# Patient Record
(demographics unavailable — no encounter records)

---

## 2017-09-23 NOTE — UC
UC General HPI





- HPI Summary


HPI Summary: 





35 YO MALE RECENTLY MOVED HERE FROM [place]





[facility]





HE HAS A SCHIZO AFFECTIVE DISORDER





IT WILL BE 2 WEEKS BEFORE HE SEES AN MD





HE CURRENTLY HAS NO COMPLAINTS











- History of Current Complaint


Chief Complaint: UCMedRefill


Stated Complaint: MED REFILL


Time Seen by Provider: 09/23/17 09:06


Hx Obtained From: Patient


Onset/Duration: Other - na


Pain Intensity: 0


Associated Signs & Symptoms: Negative: Agitation, Abdominal Pain, 

Anticoagulation Therapy, Back Pain, Confusion, Cough, Chest Pain, Decreased 

Responsiveness, Dizziness, Diarrhea, Dysuria, Decreased Oral Intake, Diaphoresis

, Edema, Fever, Headache, Hematemesis, Hemoptysis, Immunocompromised, In-

Dwelling Medication Device, Melena, Nausea, Palpitations, Recent Medication 

Changes, Syncope, SOB, Trauma, Vomiting, Wheezing, Weakness





- Allergy/Home Medications


Allergies/Adverse Reactions: 


 Allergies











Allergy/AdvReac Type Severity Reaction Status Date / Time


 


No Known Allergies Allergy   Verified 09/23/17 08:58











Home Medications: 


 Home Medications





Divalproex DR TAB(*) [Depakote DR TAB(*)] 1 tab PO BID 09/23/17 [History 

Confirmed 09/23/17]


risperiDONE TAB* [Risperdal*] 1 tab PO BEDTIME 09/23/17 [History Confirmed 09/23 /17]


risperiDONE TAB* [Risperdal*] 1 tab PO DAILY 09/23/17 [History Confirmed 09/23/ 17]











PMH/Surg Hx/FS Hx/Imm Hx


Previously Healthy: Yes


Psychological History: Other - SCHIZOAFFECTIVE DISORDER


Other Psychological History: SCHIZOAFFECTIVE DISORDER





- Surgical History


Surgical History: None





- Family History


Known Family History: Positive: Hypertension


   Negative: Cardiac Disease, Diabetes





- Social History


Alcohol Use: Occasionally


Substance Use Type: None


Smoking Status (MU): Heavy Every Day Tobacco Smoker


Amount Used/How Often: 1/2 ppd





Review of Systems


Constitutional: Negative


Skin: Negative


Eyes: Negative


ENT: Negative


Respiratory: Negative


Cardiovascular: Negative


Gastrointestinal: Negative


Genitourinary: Negative


Motor: Negative


Neurovascular: Negative


Musculoskeletal: Negative


Neurological: Negative


Psychological: Negative


Is Patient Immunocompromised?: No


All Other Systems Reviewed And Are Negative: Yes





Physical Exam


Triage Information Reviewed: Yes


Appearance: Well-Appearing, No Pain Distress, Well-Nourished


Vital Signs: 


 Initial Vital Signs











Temp  97.5 F   09/23/17 09:00


 


Pulse  68   09/23/17 09:00


 


Resp  16   09/23/17 09:00


 


BP  120/79   09/23/17 09:00


 


Pulse Ox  99   09/23/17 09:00











Vital Signs Reviewed: Yes


Eyes: Positive: Conjunctiva Clear


ENT: Positive: Hearing grossly normal.  Negative: Nasal congestion, Nasal 

drainage, Trismus, Muffled/hoarse voice


Neck: Positive: Supple, Nontender, No Lymphadenopathy


Respiratory: Positive: Lungs clear, Normal breath sounds, No respiratory 

distress, No accessory muscle use


Cardiovascular: Positive: RRR, No Murmur


Musculoskeletal: Positive: Strength Intact, ROM Intact


Neurological: Positive: Alert, Muscle Tone Normal


Psychological Exam: Normal


Skin Exam: Normal





Course/Dx





- Differential Dx - Multi-Symptom


Provider Diagnoses: MED REFILL.  SCHIZOAFFECTIVE DISORDER





Discharge





- Discharge Plan


Condition: Stable


Disposition: HOME


Prescriptions: 


Divalproex DR TAB(*) [Depakote DR TAB(*)] 500 mg PO BID #28 tab.


risperiDONE TAB* [RisperDAL*] 1 mg PO DAILY #14 tab


risperiDONE TAB* [RisperDAL*] 2 mg PO BEDTIME #14 tab


Additional Instructions: 


FOLLOW UP WITH [facility] AS PLANNED








RETURN FOR ANY PROBLEMS